# Patient Record
Sex: MALE | Race: OTHER | Employment: FULL TIME | URBAN - METROPOLITAN AREA
[De-identification: names, ages, dates, MRNs, and addresses within clinical notes are randomized per-mention and may not be internally consistent; named-entity substitution may affect disease eponyms.]

---

## 2017-02-02 ENCOUNTER — OFFICE VISIT (OUTPATIENT)
Dept: INTERNAL MEDICINE CLINIC | Facility: CLINIC | Age: 42
End: 2017-02-02

## 2017-02-02 VITALS
SYSTOLIC BLOOD PRESSURE: 120 MMHG | DIASTOLIC BLOOD PRESSURE: 80 MMHG | WEIGHT: 183.38 LBS | HEART RATE: 82 BPM | HEIGHT: 68.5 IN | BODY MASS INDEX: 27.47 KG/M2 | TEMPERATURE: 98 F

## 2017-02-02 DIAGNOSIS — Z00.00 ANNUAL PHYSICAL EXAM: Primary | ICD-10-CM

## 2017-02-02 PROCEDURE — 99386 PREV VISIT NEW AGE 40-64: CPT | Performed by: INTERNAL MEDICINE

## 2017-02-02 PROCEDURE — G0438 PPPS, INITIAL VISIT: HCPCS | Performed by: INTERNAL MEDICINE

## 2017-02-02 RX ORDER — METOPROLOL SUCCINATE 50 MG/1
50 TABLET, EXTENDED RELEASE ORAL
Qty: 30 TABLET | Refills: 11 | Status: SHIPPED | OUTPATIENT
Start: 2017-02-02 | End: 2018-02-22

## 2017-02-02 RX ORDER — ENALAPRIL MALEATE 20 MG/1
TABLET ORAL
Refills: 1 | COMMUNITY
Start: 2017-01-05 | End: 2017-02-02

## 2017-02-02 RX ORDER — ENALAPRIL MALEATE 20 MG/1
20 TABLET ORAL DAILY
Qty: 30 TABLET | Refills: 11 | Status: SHIPPED | OUTPATIENT
Start: 2017-02-02 | End: 2018-02-22

## 2017-02-02 NOTE — PROGRESS NOTES
HPI:    Patient ID: Alpa Carpio is a 38year old male. The patient arrives for an annual wellness evaluation. FHx includes Diabetes Mellitus (mother) and Lung Cancer (father, former smoker). He denies FHx of colon or prostate cancer.   HPI    Review and breath sounds normal. No respiratory distress. He has no wheezes. He has no rales. Abdominal: Soft. Bowel sounds are normal. He exhibits no distension and no mass. There is no tenderness. There is no rebound and no guarding.    Musculoskeletal: Normal Control stress and learn stress management techniques. Limit alcohol.  Call us or go to ER if any chest pain or shortness of breath, severe headache, weakness in the muscles of face, arms, or legs, drowsiness,trouble speaking, confusion, fainting or change

## 2017-02-04 ENCOUNTER — LAB ENCOUNTER (OUTPATIENT)
Dept: LAB | Age: 42
End: 2017-02-04
Attending: INTERNAL MEDICINE
Payer: COMMERCIAL

## 2017-02-04 DIAGNOSIS — Z00.00 ANNUAL PHYSICAL EXAM: ICD-10-CM

## 2017-02-04 LAB
ALBUMIN SERPL BCP-MCNC: 3.7 G/DL (ref 3.5–4.8)
ALBUMIN/GLOB SERPL: 1.1 {RATIO} (ref 1–2)
ALP SERPL-CCNC: 106 U/L (ref 32–100)
ALT SERPL-CCNC: 28 U/L (ref 17–63)
ANION GAP SERPL CALC-SCNC: 5 MMOL/L (ref 0–18)
AST SERPL-CCNC: 24 U/L (ref 15–41)
BASOPHILS # BLD: 0.1 K/UL (ref 0–0.2)
BASOPHILS NFR BLD: 1 %
BILIRUB SERPL-MCNC: 0.9 MG/DL (ref 0.3–1.2)
BUN SERPL-MCNC: 11 MG/DL (ref 8–20)
BUN/CREAT SERPL: 12.4 (ref 10–20)
CALCIUM SERPL-MCNC: 9.3 MG/DL (ref 8.5–10.5)
CHLORIDE SERPL-SCNC: 106 MMOL/L (ref 95–110)
CHOLEST SERPL-MCNC: 135 MG/DL (ref 110–200)
CO2 SERPL-SCNC: 27 MMOL/L (ref 22–32)
CREAT SERPL-MCNC: 0.89 MG/DL (ref 0.5–1.5)
EOSINOPHIL # BLD: 0.2 K/UL (ref 0–0.7)
EOSINOPHIL NFR BLD: 2 %
ERYTHROCYTE [DISTWIDTH] IN BLOOD BY AUTOMATED COUNT: 13.1 % (ref 11–15)
GLOBULIN PLAS-MCNC: 3.5 G/DL (ref 2.5–3.7)
GLUCOSE SERPL-MCNC: 108 MG/DL (ref 70–99)
HCT VFR BLD AUTO: 42.9 % (ref 41–52)
HDLC SERPL-MCNC: 45 MG/DL
HGB BLD-MCNC: 14.6 G/DL (ref 13.5–17.5)
LDLC SERPL CALC-MCNC: 81 MG/DL (ref 0–99)
LYMPHOCYTES # BLD: 2.8 K/UL (ref 1–4)
LYMPHOCYTES NFR BLD: 32 %
MCH RBC QN AUTO: 31.2 PG (ref 27–32)
MCHC RBC AUTO-ENTMCNC: 34 G/DL (ref 32–37)
MCV RBC AUTO: 91.9 FL (ref 80–100)
MONOCYTES # BLD: 0.4 K/UL (ref 0–1)
MONOCYTES NFR BLD: 5 %
NEUTROPHILS # BLD AUTO: 5.4 K/UL (ref 1.8–7.7)
NEUTROPHILS NFR BLD: 61 %
NONHDLC SERPL-MCNC: 90 MG/DL
OSMOLALITY UR CALC.SUM OF ELEC: 286 MOSM/KG (ref 275–295)
PLATELET # BLD AUTO: 192 K/UL (ref 140–400)
PMV BLD AUTO: 11.2 FL (ref 7.4–10.3)
POTASSIUM SERPL-SCNC: 4.1 MMOL/L (ref 3.3–5.1)
PROT SERPL-MCNC: 7.2 G/DL (ref 5.9–8.4)
RBC # BLD AUTO: 4.67 M/UL (ref 4.5–5.9)
SODIUM SERPL-SCNC: 138 MMOL/L (ref 136–144)
TRIGL SERPL-MCNC: 43 MG/DL (ref 1–149)
TSH SERPL-ACNC: 0.91 UIU/ML (ref 0.34–5.6)
WBC # BLD AUTO: 8.9 K/UL (ref 4–11)

## 2017-02-04 PROCEDURE — 84443 ASSAY THYROID STIM HORMONE: CPT

## 2017-02-04 PROCEDURE — 85025 COMPLETE CBC W/AUTO DIFF WBC: CPT

## 2017-02-04 PROCEDURE — 80061 LIPID PANEL: CPT

## 2017-02-04 PROCEDURE — 36415 COLL VENOUS BLD VENIPUNCTURE: CPT

## 2017-02-04 PROCEDURE — 82306 VITAMIN D 25 HYDROXY: CPT

## 2017-02-04 PROCEDURE — 80053 COMPREHEN METABOLIC PANEL: CPT

## 2017-02-06 LAB — 25(OH)D3 SERPL-MCNC: 25.2 NG/ML

## 2017-07-13 ENCOUNTER — OFFICE VISIT (OUTPATIENT)
Dept: INTERNAL MEDICINE CLINIC | Facility: CLINIC | Age: 42
End: 2017-07-13

## 2017-07-13 VITALS
BODY MASS INDEX: 28 KG/M2 | TEMPERATURE: 98 F | HEART RATE: 80 BPM | SYSTOLIC BLOOD PRESSURE: 152 MMHG | DIASTOLIC BLOOD PRESSURE: 90 MMHG | WEIGHT: 185 LBS

## 2017-07-13 DIAGNOSIS — I10 HTN, GOAL BELOW 130/80: Primary | ICD-10-CM

## 2017-07-13 DIAGNOSIS — L29.0 ANAL PRURITUS: ICD-10-CM

## 2017-07-13 PROCEDURE — 99212 OFFICE O/P EST SF 10 MIN: CPT | Performed by: INTERNAL MEDICINE

## 2017-07-13 PROCEDURE — 99214 OFFICE O/P EST MOD 30 MIN: CPT | Performed by: INTERNAL MEDICINE

## 2017-07-13 NOTE — PROGRESS NOTES
HPI:    Patient ID: Danielle Villeda is a 38year old male. Hypertension   This is a chronic problem. The current episode started more than 1 year ago. The problem is unchanged. The problem is uncontrolled (152/90).  Pertinent negatives include no chest p mg total) by mouth once daily. Disp: 30 tablet Rfl: 11   Enalapril Maleate 20 MG Oral Tab Take 1 tablet (20 mg total) by mouth daily.  Disp: 30 tablet Rfl: 11     Allergies:No Known Allergies   PHYSICAL EXAM:   Physical Exam   Constitutional: He appears wel documentation. All medical record entries made by the scribe were at my direction and in my presence.   I have reviewed the chart and discharge instructions (if applicable) and agree that the record reflects my personal performance and is accurate and compl

## 2017-12-05 ENCOUNTER — OFFICE VISIT (OUTPATIENT)
Dept: INTERNAL MEDICINE CLINIC | Facility: CLINIC | Age: 42
End: 2017-12-05

## 2017-12-05 ENCOUNTER — NURSE TRIAGE (OUTPATIENT)
Dept: OTHER | Age: 42
End: 2017-12-05

## 2017-12-05 VITALS
SYSTOLIC BLOOD PRESSURE: 149 MMHG | TEMPERATURE: 98 F | HEART RATE: 78 BPM | WEIGHT: 187 LBS | HEIGHT: 68 IN | DIASTOLIC BLOOD PRESSURE: 101 MMHG | BODY MASS INDEX: 28.34 KG/M2

## 2017-12-05 DIAGNOSIS — J02.9 SORE THROAT: Primary | ICD-10-CM

## 2017-12-05 PROCEDURE — 99213 OFFICE O/P EST LOW 20 MIN: CPT | Performed by: INTERNAL MEDICINE

## 2017-12-05 PROCEDURE — 99212 OFFICE O/P EST SF 10 MIN: CPT | Performed by: INTERNAL MEDICINE

## 2017-12-05 PROCEDURE — 87880 STREP A ASSAY W/OPTIC: CPT | Performed by: INTERNAL MEDICINE

## 2017-12-05 NOTE — PROGRESS NOTES
Patient ID: See Irene is a 38year old male.   Patient presents with:  Sore Throat: x2 days  Blood Pressure: high BP readings at home such as 170/100       HISTORY OF PRESENT ILLNESS:   HPI  2 day(s) ago  Fever - No, Tmax (N/A)  Cough - No, productiv (84.8 kg)   Height: 5' 8\" (1.727 m)       Body mass index is 28.43 kg/m². Physical Exam   Constitutional: He appears well-developed and well-nourished. HENT:   Nose: Right sinus exhibits no maxillary sinus tenderness and no frontal sinus tenderness.

## 2017-12-05 NOTE — PATIENT INSTRUCTIONS
1.  Symptomatic treatment with hot showers, steams, saltwater gargling, lozenges. 2.  Drink plenty of warm fluids throughout the day.   Cuando usted tiene dolor de garganta    El dolor de garganta puede ser El dorado y puede tener muchas causas diferente · ¿Tiene algún otro síntoma, antonia myrtle en el cuerpo, [place] o tos? · ¿Es recurrente (aparece periódicamente) mcgee dolor de garganta? En misael krystal ¿con qué frecuencia lo tiene?  ¿Cuántos días de escuela o trabajo neff perdido a consecuencia del dolor de garga · Fely líquidos calientes para aliviar la garganta y ayudar a diluir la mucosidad. Evite las bebidas alcohólicas, las comidas picantes y las bebidas ácidas antonia el jugo de [place], ya que pueden irritar más la garganta.   · Diandra Ogren con agua tibia matti En algunos casos puede ser [employer] extraer las amígdalas. [place] suele hacerse antonia paciente ambulatorio o externo (el paciente regresa a casa el mismo día de la operación).  Fields proveedor de atención médica podría aconsejarle la extracción de las [place]

## 2017-12-05 NOTE — TELEPHONE ENCOUNTER
Pt was given an appt for this afternoon. Spoke to pt via [place] , [address] #[id number]. Pt states that for 3 days he has had a sore throat, fever, and headache. He did not check his temperature.  He states that his throat is so sore that he has

## 2017-12-05 NOTE — TELEPHONE ENCOUNTER
I received a call from [facility] as pt has a sore throat. Call was disconnect. I tried calling pt back and left a message to please call us back.

## 2018-02-25 RX ORDER — METOPROLOL SUCCINATE 50 MG/1
50 TABLET, EXTENDED RELEASE ORAL
Qty: 30 TABLET | Refills: 0 | Status: SHIPPED | OUTPATIENT
Start: 2018-02-25

## 2018-02-25 RX ORDER — ENALAPRIL MALEATE 20 MG/1
20 TABLET ORAL DAILY
Qty: 30 TABLET | Refills: 0 | Status: SHIPPED | OUTPATIENT
Start: 2018-02-25

## 2025-05-16 ENCOUNTER — OFFICE VISIT (OUTPATIENT)
Dept: FAMILY MEDICINE CLINIC | Facility: CLINIC | Age: 50
End: 2025-05-16

## 2025-05-16 VITALS
HEIGHT: 68 IN | DIASTOLIC BLOOD PRESSURE: 83 MMHG | BODY MASS INDEX: 26.52 KG/M2 | SYSTOLIC BLOOD PRESSURE: 139 MMHG | WEIGHT: 175 LBS | HEART RATE: 71 BPM

## 2025-05-16 DIAGNOSIS — E11.9 TYPE 2 DIABETES MELLITUS WITHOUT COMPLICATION, WITHOUT LONG-TERM CURRENT USE OF INSULIN (HCC): ICD-10-CM

## 2025-05-16 DIAGNOSIS — N52.9 ERECTILE DYSFUNCTION, UNSPECIFIED ERECTILE DYSFUNCTION TYPE: ICD-10-CM

## 2025-05-16 DIAGNOSIS — I10 ESSENTIAL HYPERTENSION: Primary | ICD-10-CM

## 2025-05-16 PROBLEM — N47.1 PHIMOSIS: Status: ACTIVE | Noted: 2022-03-16

## 2025-05-16 LAB — HEMOGLOBIN A1C: 6.1 % (ref 4.3–5.6)

## 2025-05-16 PROCEDURE — 3079F DIAST BP 80-89 MM HG: CPT | Performed by: FAMILY MEDICINE

## 2025-05-16 PROCEDURE — 3008F BODY MASS INDEX DOCD: CPT | Performed by: FAMILY MEDICINE

## 2025-05-16 PROCEDURE — 99204 OFFICE O/P NEW MOD 45 MIN: CPT | Performed by: FAMILY MEDICINE

## 2025-05-16 PROCEDURE — 3044F HG A1C LEVEL LT 7.0%: CPT | Performed by: FAMILY MEDICINE

## 2025-05-16 PROCEDURE — 3075F SYST BP GE 130 - 139MM HG: CPT | Performed by: FAMILY MEDICINE

## 2025-05-16 PROCEDURE — 83036 HEMOGLOBIN GLYCOSYLATED A1C: CPT | Performed by: FAMILY MEDICINE

## 2025-05-16 RX ORDER — LISINOPRIL 40 MG/1
40 TABLET ORAL DAILY
COMMUNITY
Start: 2025-03-27 | End: 2025-05-16

## 2025-05-16 RX ORDER — LISINOPRIL 40 MG/1
40 TABLET ORAL DAILY
Qty: 90 TABLET | Refills: 3 | Status: SHIPPED | OUTPATIENT
Start: 2025-06-16

## 2025-05-16 RX ORDER — HYDROCHLOROTHIAZIDE 12.5 MG/1
12.5 TABLET ORAL DAILY
Qty: 90 TABLET | Refills: 3 | Status: SHIPPED | OUTPATIENT
Start: 2025-05-16 | End: 2026-05-11

## 2025-05-16 RX ORDER — TADALAFIL 20 MG/1
20 TABLET ORAL
COMMUNITY
Start: 2025-02-20 | End: 2025-05-16

## 2025-05-16 RX ORDER — TADALAFIL 20 MG/1
20 TABLET ORAL
Qty: 30 TABLET | Refills: 0 | Status: SHIPPED | OUTPATIENT
Start: 2025-05-16

## 2025-05-16 RX ORDER — METOPROLOL SUCCINATE 100 MG/1
100 TABLET, EXTENDED RELEASE ORAL DAILY
Qty: 90 TABLET | Refills: 3 | Status: SHIPPED | OUTPATIENT
Start: 2025-06-16

## 2025-05-16 RX ORDER — METOPROLOL SUCCINATE 100 MG/1
100 TABLET, EXTENDED RELEASE ORAL DAILY
COMMUNITY
Start: 2025-03-28 | End: 2025-05-16

## 2025-05-16 NOTE — PROGRESS NOTES
Remigio Hammonds is a 50 year old male who presents for an est care visit.   HPI:   Patient has a history of type 2 diabetes and uncontrolled hypertension.  Needs dilated eye exam.  Requesting refill of his medication as well as refill of Cialis.   and working    Wt Readings from Last 3 Encounters:   05/16/25 175 lb (79.4 kg)   12/05/17 187 lb (84.8 kg)   07/13/17 185 lb (83.9 kg)     Body mass index is 26.61 kg/m².     Current Medications[1]   Past Medical History[2]   Past Surgical History[3]   Family History[4]   Social History:  Short Social Hx on File[5]        REVIEW OF SYSTEMS:   Negative, except per HPI.     EXAM:   /83 (BP Location: Right arm, Patient Position: Sitting, Cuff Size: adult)   Pulse 71   Ht 5' 8\" (1.727 m)   Wt 175 lb (79.4 kg)   BMI 26.61 kg/m²     GENERAL: well developed, well nourished, in no apparent distress  SKIN: no rashes, no suspicious lesions  HEENT: atraumatic, normocephalic, ears are clear  EYES: PERRLA, EOMI, conjunctiva are clear  NECK: supple, no adenopathy    ASSESSMENT AND PLAN:       1. Essential hypertension  - Blood pressure uncontrolled, adding hydrochlorothiazide to morning medication  - hydroCHLOROthiazide 12.5 MG Oral Tab; Take 1 tablet (12.5 mg total) by mouth daily.  Dispense: 90 tablet; Refill: 3  - lisinopril 40 MG Oral Tab; Take 1 tablet (40 mg total) by mouth daily.  Dispense: 90 tablet; Refill: 3  - metoprolol succinate  MG Oral Tablet 24 Hr; Take 1 tablet (100 mg total) by mouth daily.  Dispense: 90 tablet; Refill: 3    2. Type 2 diabetes mellitus without complication, without long-term current use of insulin (HCC)  - Stable condition, continue present management.    - POC Glycohemoglobin [30202]  - OPHTHALMOLOGY - INTERNAL  - metFORMIN 500 MG Oral Tab; Take 1 tablet (500 mg total) by mouth 2 (two) times daily.  Dispense: 90 tablet; Refill: 3    3. Erectile dysfunction, unspecified erectile dysfunction type    - Tadalafil 20 MG Oral Tab;  Take 1 tablet (20 mg total) by mouth daily as needed.  Dispense: 30 tablet; Refill: 0       Jenae Veras MD  5/16/2025  2:44 PM         [1]   Current Outpatient Medications   Medication Sig Dispense Refill    hydroCHLOROthiazide 12.5 MG Oral Tab Take 1 tablet (12.5 mg total) by mouth daily. 90 tablet 3    [START ON 6/16/2025] lisinopril 40 MG Oral Tab Take 1 tablet (40 mg total) by mouth daily. 90 tablet 3    [START ON 6/16/2025] metFORMIN 500 MG Oral Tab Take 1 tablet (500 mg total) by mouth 2 (two) times daily. 90 tablet 3    [START ON 6/16/2025] metoprolol succinate  MG Oral Tablet 24 Hr Take 1 tablet (100 mg total) by mouth daily. 90 tablet 3    Tadalafil 20 MG Oral Tab Take 1 tablet (20 mg total) by mouth daily as needed. 30 tablet 0   [2]   Past Medical History:   Essential hypertension    Fatty liver    Hypertension    Type 2 diabetes mellitus (HCC)   [3]   Past Surgical History:  Procedure Laterality Date    Colonoscopy  2024    Hernia surgery  2021    inguinal hernia   [4]   Family History  Problem Relation Age of Onset    Hypertension Mother     Diabetes Mother         type 2    Cancer Father         lung   [5]   Social History  Socioeconomic History    Marital status:    Tobacco Use    Smoking status: Never     Passive exposure: Never    Smokeless tobacco: Never   Vaping Use    Vaping status: Never Used   Substance and Sexual Activity    Alcohol use: Yes    Drug use: No

## 2025-06-16 ENCOUNTER — OFFICE VISIT (OUTPATIENT)
Dept: FAMILY MEDICINE CLINIC | Facility: CLINIC | Age: 50
End: 2025-06-16

## 2025-06-16 VITALS
WEIGHT: 181.19 LBS | HEIGHT: 68 IN | SYSTOLIC BLOOD PRESSURE: 126 MMHG | HEART RATE: 58 BPM | DIASTOLIC BLOOD PRESSURE: 66 MMHG | BODY MASS INDEX: 27.46 KG/M2

## 2025-06-16 DIAGNOSIS — E11.9 TYPE 2 DIABETES MELLITUS WITHOUT COMPLICATION, WITHOUT LONG-TERM CURRENT USE OF INSULIN (HCC): ICD-10-CM

## 2025-06-16 DIAGNOSIS — I10 ESSENTIAL HYPERTENSION: Primary | ICD-10-CM

## 2025-06-16 DIAGNOSIS — K40.91 UNILATERAL RECURRENT INGUINAL HERNIA WITHOUT OBSTRUCTION OR GANGRENE: ICD-10-CM

## 2025-06-16 PROCEDURE — 3008F BODY MASS INDEX DOCD: CPT | Performed by: FAMILY MEDICINE

## 2025-06-16 PROCEDURE — 3078F DIAST BP <80 MM HG: CPT | Performed by: FAMILY MEDICINE

## 2025-06-16 PROCEDURE — 99213 OFFICE O/P EST LOW 20 MIN: CPT | Performed by: FAMILY MEDICINE

## 2025-06-16 PROCEDURE — 3074F SYST BP LT 130 MM HG: CPT | Performed by: FAMILY MEDICINE

## 2025-06-16 NOTE — PROGRESS NOTES
Subjective:   Remigio Hammonds is a 50 year old male who presents for Hernia (Inguinal hernia 5 yrs ago, now feeling pain and pressure in area)       History/Other:   History of Present Illness  Remigio Hammonds is a 50 year old male who presents with recurrent hernia symptoms.    He experiences significant pain associated with the recurrent hernia, particularly when bending over and during sexual activity. The hernia initially required surgical intervention approximately five years ago. The recurrence is on the right side, with suspected bilateral involvement.   Chief Complaint Reviewed and Verified  Nursing Notes Reviewed and   Verified  Allergies Reviewed  Medications Reviewed  Medical History   Reviewed  Surgical History Reviewed         Tobacco:  He has never smoked tobacco.    Current Medications[1]           Review of Systems:  Pertinent items are noted in HPI.      Objective:   /66   Pulse 58   Ht 5' 8\" (1.727 m)   Wt 181 lb 3.2 oz (82.2 kg)   BMI 27.55 kg/m²  Estimated body mass index is 27.55 kg/m² as calculated from the following:    Height as of this encounter: 5' 8\" (1.727 m).    Weight as of this encounter: 181 lb 3.2 oz (82.2 kg).  Results         Physical Exam  VITALS: BP- 126/66  ABDOMEN: Abdominal hernia present on the right side.      Assessment & Plan:   1. Essential hypertension (Primary)  2. Type 2 diabetes mellitus without complication, without long-term current use of insulin (HCC)  -     ALT(SGPT); Future; Expected date: 06/16/2025  -     AST (SGOT); Future; Expected date: 06/16/2025  -     Basic Metabolic Panel (8); Future; Expected date: 06/16/2025  -     Lipid Panel; Future; Expected date: 06/16/2025  -     Microalb/Creat Ratio, Random Urine; Future; Expected date: 06/16/2025  -     PSA Total, Screen; Future; Expected date: 06/16/2025  -     Hemoglobin A1C; Future; Expected date: 06/16/2025  3. Unilateral recurrent inguinal hernia without obstruction or gangrene  -      Surgery Referral - [facility]) - Adult & Peds    Assessment & Plan  Recurrent Inguinal Hernia  Recurrent right inguinal hernia, previously repaired five years ago, now symptomatic with pain during bending and sexual activity, and appears to be enlarging.  - Refer to Dr. Catalan for surgical evaluation and potential repair.  - Provide educational materials about hernias in [place].    Diabetes Mellitus  Diabetes mellitus with pending blood tests. Requires follow-up for physical examination to manage condition effectively.  - Schedule appointment with Dr. Ocampo for physical examination and diabetes management.    Recording duration: 4 minutes        Return in about 3 weeks (around 7/7/2025) for DR. ONEYDA AVENDAÑO, COMPLETE PHYSICAL.        Jacob Segal DO, 6/16/2025, 4:06 PM             [1]   Current Outpatient Medications   Medication Sig Dispense Refill    hydroCHLOROthiazide 12.5 MG Oral Tab Take 1 tablet (12.5 mg total) by mouth daily. 90 tablet 3    lisinopril 40 MG Oral Tab Take 1 tablet (40 mg total) by mouth daily. 90 tablet 3    metFORMIN 500 MG Oral Tab Take 1 tablet (500 mg total) by mouth 2 (two) times daily. 90 tablet 3    metoprolol succinate  MG Oral Tablet 24 Hr Take 1 tablet (100 mg total) by mouth daily. 90 tablet 3    Tadalafil 20 MG Oral Tab Take 1 tablet (20 mg total) by mouth daily as needed. 30 tablet 0

## 2025-06-25 ENCOUNTER — TELEPHONE (OUTPATIENT)
Dept: SURGERY | Facility: CLINIC | Age: 50
End: 2025-06-25

## 2025-06-25 ENCOUNTER — OFFICE VISIT (OUTPATIENT)
Dept: SURGERY | Facility: CLINIC | Age: 50
End: 2025-06-25

## 2025-06-25 VITALS — SYSTOLIC BLOOD PRESSURE: 147 MMHG | HEART RATE: 71 BPM | DIASTOLIC BLOOD PRESSURE: 82 MMHG

## 2025-06-25 DIAGNOSIS — K40.90 NON-RECURRENT UNILATERAL INGUINAL HERNIA WITHOUT OBSTRUCTION OR GANGRENE: Primary | ICD-10-CM

## 2025-06-25 DIAGNOSIS — K40.30 INCARCERATED RIGHT INGUINAL HERNIA: Primary | ICD-10-CM

## 2025-06-25 PROCEDURE — 3077F SYST BP >= 140 MM HG: CPT | Performed by: SURGERY

## 2025-06-25 PROCEDURE — 99203 OFFICE O/P NEW LOW 30 MIN: CPT | Performed by: SURGERY

## 2025-06-25 PROCEDURE — 3079F DIAST BP 80-89 MM HG: CPT | Performed by: SURGERY

## 2025-06-25 NOTE — PATIENT INSTRUCTIONS
Obtain your preoperative blood test.  Stop aspirin and NSAIDs for 5 days prior to surgery.    Day surgery will call with further instructions.

## 2025-06-25 NOTE — H&P
History and Physical      HPI     Chief Complaint   Patient presents with    Hernia     Pt states to have a right inguinal herniare appear, stated to gotten it removed 5 years ago       HPI  Remigio Hammonds is a 50 year old male who presents with an incarcerated recurrent right inguinal hernia.  Prior repair was laparoscopic done approximately 5 years ago.  About a year ago he noticed a recurrent bulge which has become progressively larger.  No obstructive symptoms.    Past Medical History[1]  Past Surgical History[2]  Current Medications[3]  ALLERGIES  Allergies[4]    Set as collapsible by default.[5]  Family History[6]    Review of Systems   A comprehensive 10 point review of systems was completed.  Pertinent positives and negatives noted in the the HPI.    PHYSICAL EXAM   /82 (BP Location: Right arm, Patient Position: Sitting, Cuff Size: adult)   Pulse 71  No LMP for male patient.   Constitutional: appears well hydrated alert and responsive no acute distress noted  Head/Face: normocephalic  Nose/Mouth/Throat: nose and throat are clear palate is intact mucous membranes are moist no oral lesions are noted  Neck/Thyroid: neck is supple without adenopathy  Respiratory: normal to inspection lungs are clear to auscultation bilaterally normal respiratory effort  Cardiovascular: regular rate and rhythm no murmurs, gallups, or rubs  Abdomen: soft non-tender non-distended no organomegaly noted no masses  Large incarcerated recurrent right inguinal hernia    Extremities: no edema, cyanosis, or clubbing  Neurological: exam appropriate for age reflexes and motor skills appropriate for age      ASSESSMENT/PLAN   Assessment   Encounter Diagnosis   Name Primary?    Incarcerated right inguinal hernia Yes       50 year old male with incarcerated right inguinal hernia  We have discussed the surgical risks, benefits, alternatives, and expected recovery. We will plan open repair incarcerated right inguinal hernia with mesh.  All of the patient's questions have been answered to his satisfaction.       6/25/2025  Apolinar Catalan MD               [1]   Past Medical History:   Essential hypertension    Fatty liver    Hypertension    Type 2 diabetes mellitus (HCC)   [2]   Past Surgical History:  Procedure Laterality Date    Colonoscopy  2024    Hernia surgery  2021    inguinal hernia   [3]   Current Outpatient Medications   Medication Sig Dispense Refill    hydroCHLOROthiazide 12.5 MG Oral Tab Take 1 tablet (12.5 mg total) by mouth daily. 90 tablet 3    lisinopril 40 MG Oral Tab Take 1 tablet (40 mg total) by mouth daily. 90 tablet 3    metFORMIN 500 MG Oral Tab Take 1 tablet (500 mg total) by mouth 2 (two) times daily. 90 tablet 3    metoprolol succinate  MG Oral Tablet 24 Hr Take 1 tablet (100 mg total) by mouth daily. 90 tablet 3    Tadalafil 20 MG Oral Tab Take 1 tablet (20 mg total) by mouth daily as needed. 30 tablet 0   [4] No Known Allergies  [5]   Social History  Socioeconomic History    Marital status:    Tobacco Use    Smoking status: Never     Passive exposure: Never    Smokeless tobacco: Never   Vaping Use    Vaping status: Never Used   Substance and Sexual Activity    Alcohol use: Yes    Drug use: No   [6]   Family History  Problem Relation Age of Onset    Hypertension Mother     Diabetes Mother         type 2    Cancer Father         lung

## 2025-07-03 ENCOUNTER — LAB ENCOUNTER (OUTPATIENT)
Dept: LAB | Age: 50
End: 2025-07-03
Attending: FAMILY MEDICINE
Payer: COMMERCIAL

## 2025-07-03 DIAGNOSIS — E11.9 TYPE 2 DIABETES MELLITUS WITHOUT COMPLICATION, WITHOUT LONG-TERM CURRENT USE OF INSULIN (HCC): ICD-10-CM

## 2025-07-03 DIAGNOSIS — K40.30 INCARCERATED RIGHT INGUINAL HERNIA: ICD-10-CM

## 2025-07-03 LAB
ALT SERPL-CCNC: 33 U/L (ref 10–49)
ANION GAP SERPL CALC-SCNC: 9 MMOL/L (ref 0–18)
AST SERPL-CCNC: 30 U/L (ref ?–34)
BASOPHILS # BLD AUTO: 0.06 X10(3) UL (ref 0–0.2)
BASOPHILS NFR BLD AUTO: 0.6 %
BUN BLD-MCNC: 14 MG/DL (ref 9–23)
BUN/CREAT SERPL: 14 (ref 10–20)
CALCIUM BLD-MCNC: 9.5 MG/DL (ref 8.7–10.4)
CHLORIDE SERPL-SCNC: 102 MMOL/L (ref 98–112)
CHOLEST SERPL-MCNC: 160 MG/DL (ref ?–200)
CO2 SERPL-SCNC: 29 MMOL/L (ref 21–32)
COMPLEXED PSA SERPL-MCNC: 1.06 NG/ML (ref ?–4)
CREAT BLD-MCNC: 1 MG/DL (ref 0.7–1.3)
CREAT UR-SCNC: 212.3 MG/DL
DEPRECATED RDW RBC AUTO: 39.8 FL (ref 35.1–46.3)
EGFRCR SERPLBLD CKD-EPI 2021: 91 ML/MIN/1.73M2 (ref 60–?)
EOSINOPHIL # BLD AUTO: 0.15 X10(3) UL (ref 0–0.7)
EOSINOPHIL NFR BLD AUTO: 1.5 %
ERYTHROCYTE [DISTWIDTH] IN BLOOD BY AUTOMATED COUNT: 11.6 % (ref 11–15)
EST. AVERAGE GLUCOSE BLD GHB EST-MCNC: 134 MG/DL (ref 68–126)
FASTING PATIENT LIPID ANSWER: YES
FASTING STATUS PATIENT QL REPORTED: YES
GLUCOSE BLD-MCNC: 138 MG/DL (ref 70–99)
HBA1C MFR BLD: 6.3 % (ref ?–5.7)
HCT VFR BLD AUTO: 44.2 % (ref 39–53)
HDLC SERPL-MCNC: 62 MG/DL (ref 40–59)
HGB BLD-MCNC: 14.9 G/DL (ref 13–17.5)
IMM GRANULOCYTES # BLD AUTO: 0.02 X10(3) UL (ref 0–1)
IMM GRANULOCYTES NFR BLD: 0.2 %
LDLC SERPL CALC-MCNC: 83 MG/DL (ref ?–100)
LYMPHOCYTES # BLD AUTO: 2.8 X10(3) UL (ref 1–4)
LYMPHOCYTES NFR BLD AUTO: 28.3 %
MCH RBC QN AUTO: 31.4 PG (ref 26–34)
MCHC RBC AUTO-ENTMCNC: 33.7 G/DL (ref 31–37)
MCV RBC AUTO: 93.2 FL (ref 80–100)
MICROALBUMIN UR-MCNC: <0.3 MG/DL
MONOCYTES # BLD AUTO: 0.57 X10(3) UL (ref 0.1–1)
MONOCYTES NFR BLD AUTO: 5.8 %
NEUTROPHILS # BLD AUTO: 6.3 X10 (3) UL (ref 1.5–7.7)
NEUTROPHILS # BLD AUTO: 6.3 X10(3) UL (ref 1.5–7.7)
NEUTROPHILS NFR BLD AUTO: 63.6 %
NONHDLC SERPL-MCNC: 98 MG/DL (ref ?–130)
OSMOLALITY SERPL CALC.SUM OF ELEC: 293 MOSM/KG (ref 275–295)
PLATELET # BLD AUTO: 237 10(3)UL (ref 150–450)
POTASSIUM SERPL-SCNC: 4.1 MMOL/L (ref 3.5–5.1)
RBC # BLD AUTO: 4.74 X10(6)UL (ref 4.3–5.7)
SODIUM SERPL-SCNC: 140 MMOL/L (ref 136–145)
TRIGL SERPL-MCNC: 81 MG/DL (ref 30–149)
VLDLC SERPL CALC-MCNC: 13 MG/DL (ref 0–30)
WBC # BLD AUTO: 9.9 X10(3) UL (ref 4–11)

## 2025-07-03 PROCEDURE — 80061 LIPID PANEL: CPT

## 2025-07-03 PROCEDURE — 85025 COMPLETE CBC W/AUTO DIFF WBC: CPT

## 2025-07-03 PROCEDURE — 82570 ASSAY OF URINE CREATININE: CPT

## 2025-07-03 PROCEDURE — 80048 BASIC METABOLIC PNL TOTAL CA: CPT

## 2025-07-03 PROCEDURE — 84460 ALANINE AMINO (ALT) (SGPT): CPT

## 2025-07-03 PROCEDURE — 83036 HEMOGLOBIN GLYCOSYLATED A1C: CPT

## 2025-07-03 PROCEDURE — 82043 UR ALBUMIN QUANTITATIVE: CPT

## 2025-07-03 PROCEDURE — 36415 COLL VENOUS BLD VENIPUNCTURE: CPT

## 2025-07-03 PROCEDURE — 84450 TRANSFERASE (AST) (SGOT): CPT

## 2025-07-07 ENCOUNTER — TELEPHONE (OUTPATIENT)
Dept: FAMILY MEDICINE CLINIC | Facility: CLINIC | Age: 50
End: 2025-07-07

## 2025-07-08 NOTE — TELEPHONE ENCOUNTER
----- Message from Jacob Segal sent at 7/7/2025  5:44 PM CDT -----  Results reviewed patient to schedule complete physical with Dr. Veras.  ----- Message -----  From: Lab, Background User  Sent: 7/3/2025   1:21 PM CDT  To: Jacob Segal, DO

## 2025-07-18 ENCOUNTER — EKG ENCOUNTER (OUTPATIENT)
Dept: LAB | Age: 50
End: 2025-07-18
Attending: FAMILY MEDICINE
Payer: COMMERCIAL

## 2025-07-18 ENCOUNTER — OFFICE VISIT (OUTPATIENT)
Dept: FAMILY MEDICINE CLINIC | Facility: CLINIC | Age: 50
End: 2025-07-18

## 2025-07-18 VITALS
HEART RATE: 64 BPM | HEIGHT: 68 IN | WEIGHT: 175 LBS | DIASTOLIC BLOOD PRESSURE: 90 MMHG | BODY MASS INDEX: 26.52 KG/M2 | SYSTOLIC BLOOD PRESSURE: 158 MMHG

## 2025-07-18 DIAGNOSIS — Z01.812 ENCOUNTER FOR PREPROCEDURAL LABORATORY EXAMINATION: ICD-10-CM

## 2025-07-18 DIAGNOSIS — Z01.812 ENCOUNTER FOR PREPROCEDURAL LABORATORY EXAMINATION: Primary | ICD-10-CM

## 2025-07-18 LAB
ATRIAL RATE: 56 BPM
P AXIS: 67 DEGREES
P-R INTERVAL: 184 MS
Q-T INTERVAL: 398 MS
QRS DURATION: 104 MS
QTC CALCULATION (BEZET): 384 MS
R AXIS: 19 DEGREES
T AXIS: 38 DEGREES
VENTRICULAR RATE: 56 BPM

## 2025-07-18 PROCEDURE — 3044F HG A1C LEVEL LT 7.0%: CPT | Performed by: FAMILY MEDICINE

## 2025-07-18 PROCEDURE — 93005 ELECTROCARDIOGRAM TRACING: CPT

## 2025-07-18 PROCEDURE — 3008F BODY MASS INDEX DOCD: CPT | Performed by: FAMILY MEDICINE

## 2025-07-18 PROCEDURE — 99215 OFFICE O/P EST HI 40 MIN: CPT | Performed by: FAMILY MEDICINE

## 2025-07-18 PROCEDURE — 3080F DIAST BP >= 90 MM HG: CPT | Performed by: FAMILY MEDICINE

## 2025-07-18 PROCEDURE — 93010 ELECTROCARDIOGRAM REPORT: CPT | Performed by: INTERNAL MEDICINE

## 2025-07-18 PROCEDURE — 3077F SYST BP >= 140 MM HG: CPT | Performed by: FAMILY MEDICINE

## 2025-07-18 PROCEDURE — 3061F NEG MICROALBUMINURIA REV: CPT | Performed by: FAMILY MEDICINE

## 2025-07-25 ENCOUNTER — TELEMEDICINE (OUTPATIENT)
Dept: FAMILY MEDICINE CLINIC | Facility: CLINIC | Age: 50
End: 2025-07-25

## 2025-07-25 DIAGNOSIS — I10 ESSENTIAL HYPERTENSION: Primary | ICD-10-CM

## 2025-07-25 DIAGNOSIS — Z01.812 ENCOUNTER FOR PREPROCEDURAL LABORATORY EXAMINATION: ICD-10-CM

## 2025-07-25 PROCEDURE — 98005 SYNCH AUDIO-VIDEO EST LOW 20: CPT | Performed by: FAMILY MEDICINE

## 2025-07-25 RX ORDER — HYDROCHLOROTHIAZIDE 25 MG/1
25 TABLET ORAL DAILY
Qty: 90 TABLET | Refills: 3 | Status: SHIPPED | OUTPATIENT
Start: 2025-07-25 | End: 2026-07-20

## 2025-07-25 NOTE — PROGRESS NOTES
Virtual Telephone Check-In    Remigio Hammonds verbally consents to a Virtual/Telephone Check-In service on 07/25/25.    Patient understands and accepts financial responsibility for any deductible, co-insurance and/or co-pays associated with this service.    Duration of the service: 20 minutes  This telemedicine visit was conducted using live audio and video.     Summary of topics discussed:   Monitoring blood pressure at home and it is within normal limits.  His last blood pressure today was 138/76.  Denies any chest pain shortness of breath, dizziness or palpitations.  Currently taking lisinopril 40, metoprolol 100 and has increased his hydrochlorothiazide to 25 mg.  Denies any adverse side effect to increase medication.    Physical Exam:  General: alert, speaking in full sentences, no acute distress  Lungs: respirations sound unlabored, no audible wheezing with speaking.  Neurologic: alert, oriented x3    Assessment and plan:    1. Essential hypertension  - Stable condition, continue present management.    Continue with current medication dosing  - hydroCHLOROthiazide 25 MG Oral Tab; Take 1 tablet (25 mg total) by mouth daily.  Dispense: 90 tablet; Refill: 3    2. Encounter for preprocedural laboratory examination  -Preop note updated and sent to surgical staff      Advised to call back clinic if no improvement in symptoms. Red flags discussed to go to ER.     Jenae Veras MD

## 2025-08-12 ENCOUNTER — LAB ENCOUNTER (OUTPATIENT)
Dept: LAB | Facility: HOSPITAL | Age: 50
End: 2025-08-12
Attending: SURGERY

## 2025-08-12 DIAGNOSIS — Z01.818 PREOP TESTING: ICD-10-CM

## 2025-08-12 PROCEDURE — 87635 SARS-COV-2 COVID-19 AMP PRB: CPT

## 2025-08-13 ENCOUNTER — TELEPHONE (OUTPATIENT)
Dept: SURGERY | Facility: CLINIC | Age: 50
End: 2025-08-13

## 2025-08-13 LAB — SARS-COV-2 RNA RESP QL NAA+PROBE: NOT DETECTED

## (undated) NOTE — MR AVS SNAPSHOT
[address], [address]  [address] [facility]  [phone]               Thank you for choosing us for your health care visit with Karin Thomas MD.  We are glad to serve you and happy to provide you with this summary Assoc Dx: Annual physical exam [Z00.00]           Lipid Panel    Complete by: Feb 02, 2017 (Approximate)    Assoc Dx: Annual physical exam [Z00.00]           TSH [E]    Complete by: Feb 02, 2017 (Approximate)    Assoc Dx:   Annual physical exam [Z00.00 walking, light jogging, cycling, swimming, etc.) for a goal of at least 150 minutes per week. Moderation of alcohol consumption Men: limit to <= 2 drinks* per day. Women and lighter weight persons: limit to <= 1 drink* per day.                       Heal